# Patient Record
Sex: FEMALE | Race: WHITE | NOT HISPANIC OR LATINO
[De-identification: names, ages, dates, MRNs, and addresses within clinical notes are randomized per-mention and may not be internally consistent; named-entity substitution may affect disease eponyms.]

---

## 2017-01-12 ENCOUNTER — APPOINTMENT (OUTPATIENT)
Dept: POPULATION HEALTH | Facility: CLINIC | Age: 77
End: 2017-01-12

## 2017-01-12 VITALS
HEART RATE: 107 BPM | DIASTOLIC BLOOD PRESSURE: 80 MMHG | SYSTOLIC BLOOD PRESSURE: 146 MMHG | OXYGEN SATURATION: 98 % | TEMPERATURE: 98 F

## 2017-02-02 ENCOUNTER — MESSAGE (OUTPATIENT)
Age: 77
End: 2017-02-02

## 2017-03-30 ENCOUNTER — APPOINTMENT (OUTPATIENT)
Dept: POPULATION HEALTH | Facility: CLINIC | Age: 77
End: 2017-03-30

## 2017-03-30 VITALS
OXYGEN SATURATION: 98 % | HEART RATE: 96 BPM | DIASTOLIC BLOOD PRESSURE: 84 MMHG | TEMPERATURE: 98 F | SYSTOLIC BLOOD PRESSURE: 136 MMHG

## 2017-04-20 ENCOUNTER — MESSAGE (OUTPATIENT)
Age: 77
End: 2017-04-20

## 2017-05-18 ENCOUNTER — APPOINTMENT (OUTPATIENT)
Dept: POPULATION HEALTH | Facility: CLINIC | Age: 77
End: 2017-05-18

## 2017-05-18 VITALS
DIASTOLIC BLOOD PRESSURE: 80 MMHG | HEART RATE: 87 BPM | TEMPERATURE: 98.5 F | OXYGEN SATURATION: 98 % | SYSTOLIC BLOOD PRESSURE: 142 MMHG

## 2017-06-22 ENCOUNTER — APPOINTMENT (OUTPATIENT)
Dept: POPULATION HEALTH | Facility: CLINIC | Age: 77
End: 2017-06-22

## 2017-06-22 VITALS
TEMPERATURE: 99 F | HEART RATE: 87 BPM | OXYGEN SATURATION: 98 % | SYSTOLIC BLOOD PRESSURE: 134 MMHG | DIASTOLIC BLOOD PRESSURE: 92 MMHG

## 2017-07-27 ENCOUNTER — APPOINTMENT (OUTPATIENT)
Dept: POPULATION HEALTH | Facility: CLINIC | Age: 77
End: 2017-07-27
Payer: COMMERCIAL

## 2017-07-27 PROCEDURE — 99215 OFFICE O/P EST HI 40 MIN: CPT

## 2017-07-27 PROCEDURE — 99354: CPT

## 2017-09-14 ENCOUNTER — APPOINTMENT (OUTPATIENT)
Dept: POPULATION HEALTH | Facility: CLINIC | Age: 77
End: 2017-09-14
Payer: OTHER MISCELLANEOUS

## 2017-09-14 VITALS
HEART RATE: 90 BPM | TEMPERATURE: 98.7 F | DIASTOLIC BLOOD PRESSURE: 76 MMHG | SYSTOLIC BLOOD PRESSURE: 146 MMHG | OXYGEN SATURATION: 98 %

## 2017-09-14 PROCEDURE — 99213 OFFICE O/P EST LOW 20 MIN: CPT

## 2017-11-16 ENCOUNTER — APPOINTMENT (OUTPATIENT)
Dept: POPULATION HEALTH | Facility: CLINIC | Age: 77
End: 2017-11-16
Payer: OTHER MISCELLANEOUS

## 2017-11-16 VITALS
SYSTOLIC BLOOD PRESSURE: 151 MMHG | TEMPERATURE: 98.2 F | HEIGHT: 64 IN | DIASTOLIC BLOOD PRESSURE: 79 MMHG | HEART RATE: 87 BPM | OXYGEN SATURATION: 96 %

## 2017-11-16 PROCEDURE — 99214 OFFICE O/P EST MOD 30 MIN: CPT

## 2018-01-11 ENCOUNTER — APPOINTMENT (OUTPATIENT)
Dept: POPULATION HEALTH | Facility: CLINIC | Age: 78
End: 2018-01-11
Payer: OTHER MISCELLANEOUS

## 2018-01-11 VITALS
HEART RATE: 94 BPM | DIASTOLIC BLOOD PRESSURE: 82 MMHG | SYSTOLIC BLOOD PRESSURE: 148 MMHG | TEMPERATURE: 98.6 F | OXYGEN SATURATION: 98 % | HEIGHT: 64 IN

## 2018-01-11 PROCEDURE — 99213 OFFICE O/P EST LOW 20 MIN: CPT

## 2018-04-12 ENCOUNTER — APPOINTMENT (OUTPATIENT)
Dept: POPULATION HEALTH | Facility: CLINIC | Age: 78
End: 2018-04-12
Payer: OTHER MISCELLANEOUS

## 2018-04-12 PROCEDURE — 99215 OFFICE O/P EST HI 40 MIN: CPT

## 2018-05-24 ENCOUNTER — APPOINTMENT (OUTPATIENT)
Dept: POPULATION HEALTH | Facility: CLINIC | Age: 78
End: 2018-05-24
Payer: OTHER MISCELLANEOUS

## 2018-05-24 VITALS
OXYGEN SATURATION: 98 % | DIASTOLIC BLOOD PRESSURE: 76 MMHG | TEMPERATURE: 98.5 F | SYSTOLIC BLOOD PRESSURE: 142 MMHG | HEART RATE: 88 BPM

## 2018-05-24 PROCEDURE — 99355: CPT

## 2018-05-24 PROCEDURE — 99354: CPT

## 2018-05-24 PROCEDURE — 99215 OFFICE O/P EST HI 40 MIN: CPT

## 2018-05-31 ENCOUNTER — APPOINTMENT (OUTPATIENT)
Dept: POPULATION HEALTH | Facility: CLINIC | Age: 78
End: 2018-05-31
Payer: OTHER MISCELLANEOUS

## 2018-05-31 VITALS
HEART RATE: 85 BPM | OXYGEN SATURATION: 98 % | SYSTOLIC BLOOD PRESSURE: 156 MMHG | DIASTOLIC BLOOD PRESSURE: 80 MMHG | TEMPERATURE: 98.2 F

## 2018-05-31 PROCEDURE — 99354: CPT

## 2018-05-31 PROCEDURE — 99355: CPT

## 2018-05-31 PROCEDURE — 99215 OFFICE O/P EST HI 40 MIN: CPT

## 2018-06-07 ENCOUNTER — APPOINTMENT (OUTPATIENT)
Dept: POPULATION HEALTH | Facility: CLINIC | Age: 78
End: 2018-06-07
Payer: OTHER MISCELLANEOUS

## 2018-06-07 VITALS
SYSTOLIC BLOOD PRESSURE: 146 MMHG | DIASTOLIC BLOOD PRESSURE: 79 MMHG | OXYGEN SATURATION: 98 % | TEMPERATURE: 97.9 F | HEART RATE: 82 BPM

## 2018-06-07 PROCEDURE — 99354: CPT

## 2018-06-07 PROCEDURE — 99215 OFFICE O/P EST HI 40 MIN: CPT

## 2018-06-14 ENCOUNTER — APPOINTMENT (OUTPATIENT)
Dept: POPULATION HEALTH | Facility: CLINIC | Age: 78
End: 2018-06-14
Payer: OTHER MISCELLANEOUS

## 2018-06-14 PROCEDURE — 99354: CPT

## 2018-06-14 PROCEDURE — 99215 OFFICE O/P EST HI 40 MIN: CPT

## 2018-06-28 ENCOUNTER — APPOINTMENT (OUTPATIENT)
Dept: POPULATION HEALTH | Facility: CLINIC | Age: 78
End: 2018-06-28
Payer: OTHER MISCELLANEOUS

## 2018-06-28 VITALS
OXYGEN SATURATION: 98 % | SYSTOLIC BLOOD PRESSURE: 151 MMHG | TEMPERATURE: 97.9 F | HEIGHT: 64 IN | HEART RATE: 94 BPM | DIASTOLIC BLOOD PRESSURE: 75 MMHG

## 2018-06-28 PROCEDURE — 99215 OFFICE O/P EST HI 40 MIN: CPT

## 2018-06-28 PROCEDURE — 99354: CPT

## 2018-08-02 ENCOUNTER — APPOINTMENT (OUTPATIENT)
Dept: POPULATION HEALTH | Facility: CLINIC | Age: 78
End: 2018-08-02
Payer: OTHER MISCELLANEOUS

## 2018-08-02 PROCEDURE — 99354: CPT

## 2018-08-02 PROCEDURE — 99215 OFFICE O/P EST HI 40 MIN: CPT

## 2018-08-02 PROCEDURE — 99355: CPT

## 2018-08-30 ENCOUNTER — APPOINTMENT (OUTPATIENT)
Dept: POPULATION HEALTH | Facility: CLINIC | Age: 78
End: 2018-08-30
Payer: OTHER MISCELLANEOUS

## 2018-08-30 VITALS
OXYGEN SATURATION: 99 % | TEMPERATURE: 98.6 F | HEIGHT: 64 IN | SYSTOLIC BLOOD PRESSURE: 140 MMHG | HEART RATE: 94 BPM | DIASTOLIC BLOOD PRESSURE: 80 MMHG

## 2018-08-30 PROCEDURE — 99215 OFFICE O/P EST HI 40 MIN: CPT

## 2018-10-18 ENCOUNTER — APPOINTMENT (OUTPATIENT)
Dept: POPULATION HEALTH | Facility: CLINIC | Age: 78
End: 2018-10-18
Payer: OTHER MISCELLANEOUS

## 2018-10-18 VITALS
TEMPERATURE: 98.9 F | SYSTOLIC BLOOD PRESSURE: 165 MMHG | OXYGEN SATURATION: 97 % | DIASTOLIC BLOOD PRESSURE: 76 MMHG | HEART RATE: 70 BPM

## 2018-10-18 PROCEDURE — 99214 OFFICE O/P EST MOD 30 MIN: CPT

## 2018-12-06 ENCOUNTER — APPOINTMENT (OUTPATIENT)
Dept: POPULATION HEALTH | Facility: CLINIC | Age: 78
End: 2018-12-06

## 2018-12-20 ENCOUNTER — APPOINTMENT (OUTPATIENT)
Dept: POPULATION HEALTH | Facility: CLINIC | Age: 78
End: 2018-12-20
Payer: OTHER MISCELLANEOUS

## 2018-12-20 VITALS
SYSTOLIC BLOOD PRESSURE: 149 MMHG | HEART RATE: 90 BPM | TEMPERATURE: 98.3 F | DIASTOLIC BLOOD PRESSURE: 85 MMHG | OXYGEN SATURATION: 98 %

## 2018-12-20 PROCEDURE — 99214 OFFICE O/P EST MOD 30 MIN: CPT

## 2019-02-14 ENCOUNTER — APPOINTMENT (OUTPATIENT)
Dept: POPULATION HEALTH | Facility: CLINIC | Age: 79
End: 2019-02-14
Payer: OTHER MISCELLANEOUS

## 2019-02-14 VITALS
HEART RATE: 93 BPM | TEMPERATURE: 98.6 F | HEIGHT: 64 IN | DIASTOLIC BLOOD PRESSURE: 83 MMHG | SYSTOLIC BLOOD PRESSURE: 150 MMHG | OXYGEN SATURATION: 99 %

## 2019-02-14 PROCEDURE — 99215 OFFICE O/P EST HI 40 MIN: CPT

## 2019-03-28 ENCOUNTER — APPOINTMENT (OUTPATIENT)
Dept: POPULATION HEALTH | Facility: CLINIC | Age: 79
End: 2019-03-28
Payer: OTHER MISCELLANEOUS

## 2019-03-28 VITALS
TEMPERATURE: 98.4 F | OXYGEN SATURATION: 99 % | SYSTOLIC BLOOD PRESSURE: 169 MMHG | HEART RATE: 89 BPM | DIASTOLIC BLOOD PRESSURE: 83 MMHG

## 2019-03-28 PROCEDURE — 99354: CPT

## 2019-03-28 PROCEDURE — 99215 OFFICE O/P EST HI 40 MIN: CPT

## 2019-05-23 ENCOUNTER — APPOINTMENT (OUTPATIENT)
Dept: POPULATION HEALTH | Facility: CLINIC | Age: 79
End: 2019-05-23
Payer: OTHER MISCELLANEOUS

## 2019-05-23 VITALS
TEMPERATURE: 98.9 F | DIASTOLIC BLOOD PRESSURE: 69 MMHG | OXYGEN SATURATION: 96 % | HEART RATE: 79 BPM | SYSTOLIC BLOOD PRESSURE: 165 MMHG

## 2019-05-23 PROCEDURE — 99215 OFFICE O/P EST HI 40 MIN: CPT

## 2019-07-11 ENCOUNTER — APPOINTMENT (OUTPATIENT)
Dept: POPULATION HEALTH | Facility: CLINIC | Age: 79
End: 2019-07-11
Payer: OTHER MISCELLANEOUS

## 2019-07-11 VITALS
HEART RATE: 82 BPM | DIASTOLIC BLOOD PRESSURE: 81 MMHG | TEMPERATURE: 98.6 F | HEIGHT: 64 IN | SYSTOLIC BLOOD PRESSURE: 167 MMHG | OXYGEN SATURATION: 97 %

## 2019-07-11 PROCEDURE — 99215 OFFICE O/P EST HI 40 MIN: CPT

## 2019-08-01 ENCOUNTER — APPOINTMENT (OUTPATIENT)
Dept: POPULATION HEALTH | Facility: CLINIC | Age: 79
End: 2019-08-01
Payer: OTHER MISCELLANEOUS

## 2019-08-01 VITALS
SYSTOLIC BLOOD PRESSURE: 157 MMHG | TEMPERATURE: 97.5 F | DIASTOLIC BLOOD PRESSURE: 80 MMHG | HEART RATE: 84 BPM | OXYGEN SATURATION: 98 %

## 2019-08-01 PROCEDURE — 99215 OFFICE O/P EST HI 40 MIN: CPT

## 2019-08-01 RX ORDER — LIDOCAINE 5% 700 MG/1
5 PATCH TOPICAL
Qty: 1 | Refills: 1 | Status: ACTIVE | COMMUNITY
Start: 2019-08-01 | End: 1900-01-01

## 2019-08-15 ENCOUNTER — APPOINTMENT (OUTPATIENT)
Dept: POPULATION HEALTH | Facility: CLINIC | Age: 79
End: 2019-08-15
Payer: OTHER MISCELLANEOUS

## 2019-08-15 PROCEDURE — 99214 OFFICE O/P EST MOD 30 MIN: CPT

## 2019-09-26 ENCOUNTER — APPOINTMENT (OUTPATIENT)
Dept: POPULATION HEALTH | Facility: CLINIC | Age: 79
End: 2019-09-26
Payer: OTHER MISCELLANEOUS

## 2019-09-26 PROCEDURE — 99215 OFFICE O/P EST HI 40 MIN: CPT

## 2019-11-07 ENCOUNTER — APPOINTMENT (OUTPATIENT)
Dept: POPULATION HEALTH | Facility: CLINIC | Age: 79
End: 2019-11-07
Payer: OTHER MISCELLANEOUS

## 2019-11-07 PROCEDURE — 99215 OFFICE O/P EST HI 40 MIN: CPT

## 2019-12-12 ENCOUNTER — APPOINTMENT (OUTPATIENT)
Dept: POPULATION HEALTH | Facility: CLINIC | Age: 79
End: 2019-12-12
Payer: OTHER MISCELLANEOUS

## 2019-12-12 PROCEDURE — 99215 OFFICE O/P EST HI 40 MIN: CPT

## 2019-12-12 PROCEDURE — 99354: CPT

## 2020-01-09 ENCOUNTER — APPOINTMENT (OUTPATIENT)
Dept: POPULATION HEALTH | Facility: CLINIC | Age: 80
End: 2020-01-09
Payer: OTHER MISCELLANEOUS

## 2020-01-09 PROCEDURE — 99215 OFFICE O/P EST HI 40 MIN: CPT

## 2020-01-23 ENCOUNTER — APPOINTMENT (OUTPATIENT)
Dept: POPULATION HEALTH | Facility: CLINIC | Age: 80
End: 2020-01-23
Payer: OTHER MISCELLANEOUS

## 2020-01-23 PROCEDURE — 99215 OFFICE O/P EST HI 40 MIN: CPT

## 2020-02-06 ENCOUNTER — APPOINTMENT (OUTPATIENT)
Dept: POPULATION HEALTH | Facility: CLINIC | Age: 80
End: 2020-02-06
Payer: OTHER MISCELLANEOUS

## 2020-02-06 VITALS
TEMPERATURE: 98.2 F | HEART RATE: 91 BPM | SYSTOLIC BLOOD PRESSURE: 159 MMHG | DIASTOLIC BLOOD PRESSURE: 74 MMHG | OXYGEN SATURATION: 98 %

## 2020-02-06 PROCEDURE — 99215 OFFICE O/P EST HI 40 MIN: CPT

## 2020-02-20 ENCOUNTER — APPOINTMENT (OUTPATIENT)
Dept: POPULATION HEALTH | Facility: CLINIC | Age: 80
End: 2020-02-20
Payer: OTHER MISCELLANEOUS

## 2020-02-20 PROCEDURE — 99215 OFFICE O/P EST HI 40 MIN: CPT

## 2020-02-27 ENCOUNTER — APPOINTMENT (OUTPATIENT)
Dept: POPULATION HEALTH | Facility: CLINIC | Age: 80
End: 2020-02-27
Payer: OTHER MISCELLANEOUS

## 2020-02-27 VITALS
SYSTOLIC BLOOD PRESSURE: 144 MMHG | DIASTOLIC BLOOD PRESSURE: 86 MMHG | TEMPERATURE: 98 F | HEART RATE: 96 BPM | OXYGEN SATURATION: 98 %

## 2020-02-27 PROCEDURE — 99215 OFFICE O/P EST HI 40 MIN: CPT

## 2020-03-05 ENCOUNTER — APPOINTMENT (OUTPATIENT)
Dept: POPULATION HEALTH | Facility: CLINIC | Age: 80
End: 2020-03-05
Payer: OTHER MISCELLANEOUS

## 2020-03-05 VITALS
OXYGEN SATURATION: 98 % | TEMPERATURE: 98.3 F | SYSTOLIC BLOOD PRESSURE: 146 MMHG | DIASTOLIC BLOOD PRESSURE: 84 MMHG | HEART RATE: 98 BPM

## 2020-03-05 PROCEDURE — 99205 OFFICE O/P NEW HI 60 MIN: CPT

## 2020-04-30 ENCOUNTER — APPOINTMENT (OUTPATIENT)
Dept: POPULATION HEALTH | Facility: CLINIC | Age: 80
End: 2020-04-30
Payer: OTHER MISCELLANEOUS

## 2020-04-30 DIAGNOSIS — M54.2 CERVICALGIA: ICD-10-CM

## 2020-04-30 DIAGNOSIS — F45.8 OTHER SOMATOFORM DISORDERS: ICD-10-CM

## 2020-04-30 PROCEDURE — 99443: CPT

## 2020-07-16 ENCOUNTER — APPOINTMENT (OUTPATIENT)
Dept: POPULATION HEALTH | Facility: CLINIC | Age: 80
End: 2020-07-16
Payer: OTHER MISCELLANEOUS

## 2020-07-16 PROCEDURE — 99215 OFFICE O/P EST HI 40 MIN: CPT | Mod: 95

## 2020-09-17 ENCOUNTER — APPOINTMENT (OUTPATIENT)
Dept: POPULATION HEALTH | Facility: CLINIC | Age: 80
End: 2020-09-17
Payer: OTHER MISCELLANEOUS

## 2020-09-17 PROCEDURE — 99443: CPT

## 2020-09-23 NOTE — HISTORY OF PRESENT ILLNESS
[FreeTextEntry1] : We began this TELEPHONIC evaluation at 5:32 pm and ended at 5:52 pm and then resumed at 6:20 pm until 7:05 pm. \par \par WORK STATUS: very part time, limited \par TEMP IMPAIRMENT: refer to C-4.3 form\par \par Ms. Campbell notes her hand pain has been worsening R > L and the digits are even stiffer, that they are all triggering and she is knocking objects over and dropping them more frequently; and can't  objects with her fingers and has to grab and hold them with care to avoid dropping them. \par \par Ms. Campbell also notes that her neck pain is worsening and the pain is almost constant and stressors are associated with aggravation of cervical symptoms.  She notes that her R thumb triggering which is worsening is also associated with pain that radiates up to her neck.  The OT sessions with [facility] help to a great degree but their effect wears off.  She has also experienced R buttock pain in the past but recently she could "feel the pain running in one line" from the neck into the R arm and hand but also down her entire back into the R buttock; this occurs sometimes. We also discussed that her R eye has been experiencing retinal problems related to diabetes and I considered its effect on neck position as well.\par She has also consulted with , SALTY Harrison, Phd regarding a new chair but he will not be able to assist her until next month.  However, she replaced the cushion in the chair she already has and is hoping that will help in the meantime.  Her MDs have been evaluating her lower abdominal region as well but have not yet found a cause for her low back pain and I suggested an MRI of her L spine should be considered. \par \par Ms. Campbell also noted that after our last evaluation, there was a work comp board hearing and she has not received a notice of decision from that date yet.\par \par Assessment\par Symptomatic worsening of upper extremity and cervical spine symptoms with spreading to lower spine and lower extremity on the right; refer to C-4.2 form for diagnoses\par \par Plan\par 1)  Advise sending me photos of hands and fingers if possible\par 2)  I need to speak with pt's work comp  re; worsening hand symptoms and function in addition to possible connection to the spreading of cervical spine symptoms to lower spine and lower extremity on the Right\par 3)  Requested most recent progress notes from hand therapist\par 4)  C-4.2 form to be completed\par 5)  Re-eval in 4 weeks.

## 2021-03-04 ENCOUNTER — APPOINTMENT (OUTPATIENT)
Dept: POPULATION HEALTH | Facility: CLINIC | Age: 81
End: 2021-03-04
Payer: OTHER MISCELLANEOUS

## 2021-03-04 PROCEDURE — 99443: CPT

## 2021-07-28 ENCOUNTER — APPOINTMENT (OUTPATIENT)
Dept: POPULATION HEALTH | Facility: CLINIC | Age: 81
End: 2021-07-28
Payer: OTHER MISCELLANEOUS

## 2021-07-28 PROCEDURE — 99215 OFFICE O/P EST HI 40 MIN: CPT | Mod: 95

## 2021-07-28 NOTE — HISTORY OF PRESENT ILLNESS
[FreeTextEntry1] : We began this TELEHEALTH evaluation at 1:48 pm and ended at 2:45 pm. \par \par Work status: working, very part time\par Temp impairment: refer to [facility]\par Last exam: Sept 17, 2020\par \par Ms. Campbell notes she has been noticing triggering of all her digits during her ADLs including the thumbs but more on the R hand; she has also been noticing 5th digit triggering as well but its infrequent, and on both hands.  She also notes pain in the R > L lateral epicondyle region during ADLs. She has not had in person OT since late Oct/early NOv 2020.  Ms. Campbell had been evaluated by our , ALYSSA Harrison PT PhD, and she needs an ergonomic chair since her current one is falling apart; the Links chair is closest to what she needs currently.  She has serious concern about taking the subway to get to the OT sessions w/[facility] and she is also impaired to the point she can't hold onto rails or protect herself given the state of her hands.  After her second vaccine, she notes she is "destabilized" and can't go down the escalator and also has a headache and she does have some sinus disease. The journey to and from hands on therapy has to be considered. \par \par Physical\par C-AROM:\par Ext/Flex: 30/70 deg and notes pain in the neck and both shoulders on extension of her neck\par Lat Rotn: R = 20 deg, L = 30 deg\par Lat Flex: R = 20 deg, L = 30 deg w/pain notes on both sides\par Self palpation: no palpable muscle knots in sup trapezius region, bilat\par \par Shoulders:\par AROM: Forw. Flex: 160 deg bilat and maintaining this position for 30 sec's: pt's R shoulder droops significantly and arms begin to shake to mild extent; no c/o paresthesias bilat\par \par \par Hands:\par Phalen's test: 30 sec's: no significant changes: no numbness or tingling bilat but notes pain in R lat epicondyle\par Can't make a full fist on either side and by REBEL 2 cm gap in R hand and 0.5 cm gap in L hand\par Resting habitus of fingers: flexion contracture in R 3rd > 2nd digits and L 2nd and 3rd digits\par Self palpation: digit tips are warm to touch to both cheeks.\par \par Assessment\par Symptomatic hand, forearm, shoulder and neck injuries; refer to C-4.2 form for diagnoses\par \par Plan\par 1)   Advised pt obtain cab service to get to [facility] for OT w/[facility], OT, CHT\par 2)  [facility] 's decision being controverted\par 3)  Rx for hand/arm abacus to be written, discussed w/pt\par 4)  Rx for OT, [facility] to be written\par 5)  Needs to consult w/hand surgeon for trigger finger surgery\par 6)  C-4.2 form to be completed\par 7)  Re-eval in 2 months\par

## 2022-01-01 ENCOUNTER — APPOINTMENT (OUTPATIENT)
Dept: POPULATION HEALTH | Facility: CLINIC | Age: 82
End: 2022-01-01

## 2022-01-01 DIAGNOSIS — M65.9 SYNOVITIS AND TENOSYNOVITIS, UNSPECIFIED: ICD-10-CM

## 2022-01-01 DIAGNOSIS — M48.02 SPINAL STENOSIS, CERVICAL REGION: ICD-10-CM

## 2022-01-01 PROCEDURE — 99215 OFFICE O/P EST HI 40 MIN: CPT | Mod: 95

## 2022-09-15 PROBLEM — M48.02 FORAMINAL STENOSIS OF CERVICAL REGION: Status: ACTIVE | Noted: 2017-03-30

## 2022-09-15 NOTE — PLAN
[FreeTextEntry1] : Refer to MD note [FreeTextEntry2] : n/a [FreeTextEntry3] : Guarded [FreeTextEntry4] : 4 months

## 2022-09-15 NOTE — ASSESSMENT
[Indicate if, in your opinion, the incident that the patient described was the competent medical cause of this injury/illness.] : The incident that the patient described was the competent medical cause of this injury/illness: Yes [Indicate if the patient's complaints are consistent with his/her history of the injury/illness.] : Indicate if the patient's complaints are consistent with his/her history of the injury/illness: Yes [Yes] : Yes, it is consistent [FreeTextEntry5] : 70 [FreeTextEntry6] : Refer to MD notes and documents in file

## 2022-09-15 NOTE — PROCEDURE
[FreeTextEntry1] : We began this TELEHEALTH evaluation at 2:15 pm and ended at 3:12 pm. 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 70%\par Last exam: July 28, 2021\par \par Ms. Gold notes she cannot hand write to the point where her bank has sent back checks since her signature is often illegible; she can print a bit better than script.  Her hands are more disfigured than prior exams. 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  She is dropping objects even more so than her last exam.  She doesn't feel as if there is numbness but her fingers are swollen and she is not sure if dropping objects is due to her not feeling them as well.  She denies coldness of the fingers or hands.  Her neck hurts almost constantly and she often has to lie down and perform chin tucks lying down to try to stretch her spine.  Her fingers all trigger except her 5th digits and her thumbs were triggering at one point and only sporadically currently.  Her current chair needs to be replaced because her injuries are getting worse but its about 9 years old and she is more symptomatic being seated; she had the pan replaced already. \par \par Physical\par REBEL: Hands: Hand span diminished between 1st and 2nd digits on R c/w L hand; flexion deformity mild but constant in middle 3 digits both hands\par Pt cannot make a full fist by approx. 1 cm both hands\par C-AROM: Lat Flex: R = 25 deg, L = 5 deg (pt notes pain and inability to flex laterally to L)\par Ext/Flex: 30/70-80 deg\par Lat Rotn: R = 30 deg, L = 5 - 10 deg\par \par Assessment\par Symptomatic worsening of neck and hand symptoms affecting ADLs\par \par Plan\par 1)  Authorization for OT to be requested\par 2)  Rx for OT to be written\par 3)  Self management measures advised re: tendon glides in warm water or moist heat squeezing a large sponge advised preceded by c-spine stretch as demonstrated to pt today\par 4)  Advised pt consult w/hand surgeon re: surgical options for trigger finger and lack of mobility in flexion of digits\par 5)  Advised consult w/ since pt needs new chair\par 6)  Re-eval in 4 months\par  MED

## 2022-09-15 NOTE — HISTORY OF PRESENT ILLNESS
[Has the patient missed work because of the injury/illness?] : The patient has missed work because of the injury/illness. [Yes] : The patient is currently working. [Resumed limited work of any kind: ______] : The patient resumed limited work on [unfilled]. [FreeTextEntry7] : sporadic, limited work restricted due to established injuries